# Patient Record
Sex: MALE
[De-identification: names, ages, dates, MRNs, and addresses within clinical notes are randomized per-mention and may not be internally consistent; named-entity substitution may affect disease eponyms.]

---

## 2021-11-16 ENCOUNTER — APPOINTMENT (OUTPATIENT)
Dept: NEUROLOGY | Facility: CLINIC | Age: 41
End: 2021-11-16
Payer: COMMERCIAL

## 2021-11-16 VITALS
TEMPERATURE: 98.5 F | DIASTOLIC BLOOD PRESSURE: 85 MMHG | HEIGHT: 74 IN | SYSTOLIC BLOOD PRESSURE: 128 MMHG | HEART RATE: 73 BPM | BODY MASS INDEX: 28.23 KG/M2 | WEIGHT: 220 LBS | OXYGEN SATURATION: 98 %

## 2021-11-16 DIAGNOSIS — Z87.891 PERSONAL HISTORY OF NICOTINE DEPENDENCE: ICD-10-CM

## 2021-11-16 DIAGNOSIS — R42 DIZZINESS AND GIDDINESS: ICD-10-CM

## 2021-11-16 DIAGNOSIS — Z87.898 PERSONAL HISTORY OF OTHER SPECIFIED CONDITIONS: ICD-10-CM

## 2021-11-16 PROBLEM — Z00.00 ENCOUNTER FOR PREVENTIVE HEALTH EXAMINATION: Status: ACTIVE | Noted: 2021-11-16

## 2021-11-16 PROCEDURE — 99204 OFFICE O/P NEW MOD 45 MIN: CPT

## 2021-11-16 NOTE — PHYSICAL EXAM
[FreeTextEntry1] : Mental Status: AAO x3, no dysarthria, no aphasia, communicating appropriately\par CN: PERRL, EOMI, VFF, V1-V3 sensation intact, hearing grossly intact, no facial asymmetry, tongue midline\par Motor: 5/5 x 4 extremities\par Sensory: intact to light touch throughout\par Reflexes: 2+ throughout, toes equivocal bilaterally\par Coordination: no dysmetria on FTN\par Gait: steady\par \par  [General Appearance - Alert] : alert [Sclera] : the sclera and conjunctiva were normal [Outer Ear] : the ears and nose were normal in appearance [Abnormal Walk] : normal gait [Skin Color & Pigmentation] : normal skin color and pigmentation

## 2021-11-16 NOTE — HISTORY OF PRESENT ILLNESS
[FreeTextEntry1] : 39 yo man with medical conditions as outlined below who presents for further evaluation of episodic dizziness. He states in April, he donated blood. He states two days after this, he began to feel lightheaded. He states he had bloodwork done with his PCP and was told everything looked okay. He states that feeling resolved, but 3 months ago it began to happen frequently. He states he feels it when sitting or standing. He can still function through it. He denies change in speech, change in vision or numbness or tingling. He states he has felt palpitations in the past and has had a full cardiac workup the beginning of this year which was unrevealing. He states he sleeps well at night and denies feeling stressed out. He has a history of alcohol abuse but has not had alcohol in over a year. He has no further complaints.

## 2021-11-16 NOTE — ASSESSMENT
[FreeTextEntry1] : 41 yo man with episodic lightheadedness\par \par Referral to ENT\par MRI brain w/o contrast\par Carotid US\par \par He was advised to notify me for worsening symptoms.\par I will see him back in 3 months or sooner if necessary\par

## 2022-02-14 ENCOUNTER — APPOINTMENT (OUTPATIENT)
Dept: NEUROLOGY | Facility: CLINIC | Age: 42
End: 2022-02-14